# Patient Record
(demographics unavailable — no encounter records)

---

## 2025-01-28 NOTE — END OF VISIT
[] : Fellow [FreeTextEntry3] : pt doing well on kesimpta. neuro exam is normal.  [Time Spent: ___ minutes] : I have spent [unfilled] minutes of time on the encounter which excludes teaching and separately reported services.

## 2025-01-28 NOTE — HISTORY OF PRESENT ILLNESS
[FreeTextEntry1] : Reason for consult: MS  Initial hx 3/2024 10/2010: bl weakness and numbness from trunk down x 1 week. MRI showed spine lesions. Inconclusive CSF. 2/2011: Started seeing Dr. Rodriguez. Diagnosed with MS. Copaxone. Spring 2012: bl hand numbness and weakness. Got IVMP. Fall 2012: nausea and vertigo later revealed a ?posterior circ plaque and got IVMP x3 days 2013: Started Tysabri q4 weeks after son was born. bl tubal ligation. Summer 2023: One day of RUE weakness, got one day of steroids and felt better. Fall 2023: Started using cane occasionally when she has some RLE weakness, often with weather changes. 2022 - Started q6wks tysabri 2023 - had RUE weakness so got 1d of IVMP which helps. 3/2024 - started kesimpta   Subj interval:  No new symptoms  Cognitive sx a bit better after weight loss.  Losing weight with zepbound - 30lbs.   ROS/Current Sx: Difficulty seeing in the dark. After prolonged reading gets a momentary flash of light. Some paraphasic errors when she is more tired or warm weather past 4 years. Occasional bites tongue. Started pilates, feels less weak on the right side. No bb dysfunction. Occasional sleep issues depending on anxiety level. Mood generally is happy but some anxiety that has been building.   PMHX: Hypothyroid PCOS MS Low ferritin (occasional iron infusions)  MEDS: Sertraline 75 mg  Kesimpta Spironolactone 100mg Hasnt taken but has Rx synthroid 88 mcg MV vitD3 4ku daily Zepbound iron supplement MV Vitamin D (gummies) Vitamin B12  ALL: Poppy seeds (itchy), MS triggered by legumes and dairy. nkda.  SHx: Etoh: 2 glasses of wine 3x/week. No lifetime tobacco. Never drugs. 4 glasses of water a day. 4 cups of coffee a day (before 10:30AM). 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  is working on the [employer] changover from [employer].  FHx: Hypothyroid Uncle might have CIS or MS was on copaxone   Exam: AO3. Normally conversant. Follows commands, names, and repeats. Good attention.  PERRL,?L APD but mild if there. VFF, EOMI, no nystagmus, face symmetric, TUP at midline.  Motor:  R: L: Del 5 5 Bi 5 5 Tri 5 5 Wrist Extensors 5 5 Finger abductors 5 5  5 5  HF 5 5 KE 5 5 KF 5 5 DF 5 5 PF 5 5  Tone R L UE 0 0 LE 0 0  Sensory RUE LUE RLE LLE LT + + + + Vib + + + + JPS + + + + PP + + + + Temp + + + +  Reflexes:  R L Biceps 2 2 BR 2 2 Triceps 2 2 Pat 2 2 AJ 2 2  TOES F F  Coordination:  R L FTN 0 0 MARYANNE 0 0 HTS 0 0  Other   Gait: Able to heel, toe, tandem well  Assistance: none   JCV+ 1.93 (2/2024), 2.35 (8/2023) 2.47 (5/2023)   MRI C and T spine 2010 - at least several lesions, at least 1 enhancing.  MR brain 11/2023: no new lesions (done in [facility] system)  MRI brain 12/2024 (pacs) - stable on my review.   AP: 45y right-handed female PMH low ferritin hypothyroidism and RRMS stable on Tysabri here to establish care for MS. Clinically and radiographically stable on Tysabri. JCV elevated so switched to q6wk dosing in 2022. Discussed high titer and possiblity of DMT switch. She opted to start kesimpta. Started kesimpta 3/2024.  Cognitive issues - may be related to hypothyroid.  all questions answered, education provided, management discussed at length,  - cont kesimpta - Blood work q 6 months - MR Brain 12/2025 - cont vitamin D 4ku - Encouraged hydration, less caffeine - Continue exercise with pilates - f/u w pmd re: hypothyroid, iron - RTC 6m sooner prn

## 2025-05-22 NOTE — PLAN
[TextEntry] : Low suspicion that this is an underlying mass. more likely cracked wound.  Recommend aggressive moisturization/humidification.   Fu 2 months. If not healed, will bx/imaging.

## 2025-05-22 NOTE — PROCEDURE
[de-identified] : nora [de-identified] : Procedure: Bilateral nasal endoscopy (CPT 65321)   Indication: Anterior rhinoscopy was inadequate to evaluate pathology.  Left: Septum midline Moderate inferior turbinate hypertrophy  Middle meatus clear, without masses, polyps, or pus Sphenoethmoidal recess clear, without masses, polyps, or pus  Right:  Septum mild dryness with thin crusting anteriorly Moderate inferior turbinate hypertrophy Middle meatus clear, without masses, polyps, or pus  Sphenoethmoidal recess clear, without masses, polyps, or pus

## 2025-05-22 NOTE — PHYSICAL EXAM
[Nasal Endoscopy Performed] : nasal endoscopy was performed, see procedure section for findings [de-identified] : open skin wound at medial L nostril [Normal] :  tongue is normal

## 2025-05-22 NOTE — HISTORY OF PRESENT ILLNESS
[de-identified] : 45 year old female presents with right nostril pain  History of Multiple sclerosis, PCOS, and Anxiety   Reports right nostril pain, dry right nostril, hard mucus and occasional blood tinge mucus from right nostril for the past 3 months  Occasionally has right nostril anterior rhinorrhea Left nostrils feels normal.  Has tried using baci oint to nostril and saline gel spray with partial relief  Denies epistaxis, nasal congestion, sinus pressure/pain, PND, dysphagia, dyspnea, and recent sinus infections